# Patient Record
Sex: FEMALE | URBAN - METROPOLITAN AREA
[De-identification: names, ages, dates, MRNs, and addresses within clinical notes are randomized per-mention and may not be internally consistent; named-entity substitution may affect disease eponyms.]

---

## 2019-07-29 ENCOUNTER — APPOINTMENT (OUTPATIENT)
Dept: URBAN - METROPOLITAN AREA CLINIC 186 | Age: 39
Setting detail: DERMATOLOGY
End: 2019-07-29

## 2019-07-29 DIAGNOSIS — L20.89 OTHER ATOPIC DERMATITIS: ICD-10-CM

## 2019-07-29 PROCEDURE — OTHER COUNSELING: OTHER

## 2019-07-29 PROCEDURE — OTHER PRESCRIPTION: OTHER

## 2019-07-29 PROCEDURE — OTHER TREATMENT REGIMEN: OTHER

## 2019-07-29 PROCEDURE — 99202 OFFICE O/P NEW SF 15 MIN: CPT

## 2019-07-29 PROCEDURE — OTHER DIAGNOSIS COMMENT: OTHER

## 2019-07-29 PROCEDURE — OTHER ADDITIONAL NOTES: OTHER

## 2019-07-29 PROCEDURE — OTHER DUPIXENT INITIATION: OTHER

## 2019-07-29 RX ORDER — HYDROXYZINE HYDROCHLORIDE 25 MG/1
TABLET, FILM COATED ORAL
Qty: 30 | Refills: 2 | Status: ERX | COMMUNITY
Start: 2019-07-29

## 2019-07-29 ASSESSMENT — LOCATION ZONE DERM: LOCATION ZONE: TRUNK

## 2019-07-29 ASSESSMENT — LOCATION DETAILED DESCRIPTION DERM: LOCATION DETAILED: LEFT MEDIAL UPPER BACK

## 2019-07-29 ASSESSMENT — LOCATION SIMPLE DESCRIPTION DERM: LOCATION SIMPLE: LEFT UPPER BACK

## 2019-07-29 NOTE — HPI: RASH
What Type Of Note Output Would You Prefer (Optional)?: Bullet Format
How Severe Is Your Rash?: moderate
Is This A New Presentation, Or A Follow-Up?: Rash
Additional History: Patient states she has a rash whenever she is not taking steroids. She has had a patch test before but she thinks the results were inaccurate and would like a new one done.

## 2019-07-29 NOTE — PROCEDURE: ADDITIONAL NOTES
Detail Level: Simple
Additional Notes: Patch test results: positive for methylchloroisothiazinoline / methylisothiazinolone, disperse blue mix 124/106, ethyleneurea melamine formaldehyde mix, methyldibtomo glutaronitrile, cinnamic aldehyde, lavandula angustifolia oil, fragrance mix ii, budesonide, imidazolidinyl urea (germall 115), formaldehyde, methylisothiazinolone
Additional Notes: Patient completed last course of prednisone 2 days ago. Discussed extended patch test in 2 weeks. Patient understands she needs to be off prednisone for 2 weeks before patch testing, will need to start PA through insurance for patch test.

## 2019-07-29 NOTE — PROCEDURE: TREATMENT REGIMEN
Detail Level: Zone
Initiate Treatment: Hydroxyzine 25mg, take by mouth nightly for pruritus. If no improvement within 30min can take a second tablet
Continue Regimen: Topicort, as prescribed by outside provider

## 2019-07-29 NOTE — PROCEDURE: DIAGNOSIS COMMENT
Comment: Patient Has long history of eczema. Has had PATCH TESTING with many allergies patient states she was flared on her back at the time.  Discussed condition and expectations at length. Discussed possible rePAtch test under clear conditions.  Patient currently on prednisone for a flare and takes often in addition to previous topical use with Topicort. Discussed adding dupixent after patch testing as patient flares consistently and is on oral steroids many times over the last year.  Will continue Topicort and will patch test 3 weeks after prednisone.
Detail Level: Zone

## 2019-12-05 ENCOUNTER — APPOINTMENT (OUTPATIENT)
Dept: URBAN - METROPOLITAN AREA CLINIC 186 | Age: 39
Setting detail: DERMATOLOGY
End: 2019-12-05

## 2019-12-05 DIAGNOSIS — L20.89 OTHER ATOPIC DERMATITIS: ICD-10-CM

## 2019-12-05 PROCEDURE — OTHER DUPIXENT MONITORING: OTHER

## 2019-12-05 PROCEDURE — OTHER DIAGNOSIS COMMENT: OTHER

## 2019-12-05 PROCEDURE — OTHER MIPS QUALITY: OTHER

## 2019-12-05 PROCEDURE — OTHER ADDITIONAL NOTES: OTHER

## 2019-12-05 PROCEDURE — 99213 OFFICE O/P EST LOW 20 MIN: CPT

## 2019-12-05 PROCEDURE — OTHER COUNSELING: OTHER

## 2019-12-05 PROCEDURE — OTHER PRESCRIPTION: OTHER

## 2019-12-05 RX ORDER — HALOBETASOL PROPIONATE 0.1 MG/G
LOTION TOPICAL
Qty: 1 | Refills: 2 | Status: ERX | COMMUNITY
Start: 2019-12-05

## 2019-12-05 ASSESSMENT — LOCATION DETAILED DESCRIPTION DERM
LOCATION DETAILED: RIGHT RADIAL DORSAL HAND
LOCATION DETAILED: RIGHT ANTECUBITAL SKIN
LOCATION DETAILED: LEFT ANTECUBITAL SKIN
LOCATION DETAILED: LEFT RADIAL DORSAL HAND

## 2019-12-05 ASSESSMENT — BSA ECZEMA: % BODY COVERED IN ECZEMA: 3

## 2019-12-05 ASSESSMENT — LOCATION SIMPLE DESCRIPTION DERM
LOCATION SIMPLE: RIGHT UPPER ARM
LOCATION SIMPLE: LEFT UPPER ARM
LOCATION SIMPLE: LEFT HAND
LOCATION SIMPLE: RIGHT HAND

## 2019-12-05 ASSESSMENT — LOCATION ZONE DERM
LOCATION ZONE: HAND
LOCATION ZONE: ARM

## 2019-12-19 ENCOUNTER — RX ONLY (RX ONLY)
Age: 39
End: 2019-12-19

## 2019-12-19 RX ORDER — DUPILUMAB 300 MG/2ML
INJECTION, SOLUTION SUBCUTANEOUS
Qty: 1 | Refills: 5 | Status: ERX | COMMUNITY
Start: 2019-12-19
